# Patient Record
Sex: FEMALE | Race: WHITE | NOT HISPANIC OR LATINO | URBAN - METROPOLITAN AREA
[De-identification: names, ages, dates, MRNs, and addresses within clinical notes are randomized per-mention and may not be internally consistent; named-entity substitution may affect disease eponyms.]

---

## 2023-08-19 ENCOUNTER — OFFICE VISIT (OUTPATIENT)
Dept: URGENT CARE | Facility: CLINIC | Age: 23
End: 2023-08-19
Payer: COMMERCIAL

## 2023-08-19 VITALS
RESPIRATION RATE: 18 BRPM | HEART RATE: 129 BPM | HEIGHT: 65 IN | TEMPERATURE: 103 F | OXYGEN SATURATION: 99 % | SYSTOLIC BLOOD PRESSURE: 124 MMHG | BODY MASS INDEX: 19.99 KG/M2 | DIASTOLIC BLOOD PRESSURE: 75 MMHG | WEIGHT: 120 LBS

## 2023-08-19 DIAGNOSIS — R50.9 FEVER, UNSPECIFIED FEVER CAUSE: Primary | ICD-10-CM

## 2023-08-19 DIAGNOSIS — R09.81 NASAL CONGESTION: ICD-10-CM

## 2023-08-19 LAB
CTP QC/QA: YES
MOLECULAR STREP A: NEGATIVE
POC MOLECULAR INFLUENZA A AGN: NEGATIVE
POC MOLECULAR INFLUENZA B AGN: NEGATIVE
SARS-COV-2 AG RESP QL IA.RAPID: NEGATIVE

## 2023-08-19 PROCEDURE — 99203 PR OFFICE/OUTPT VISIT, NEW, LEVL III, 30-44 MIN: ICD-10-PCS | Mod: S$GLB,,,

## 2023-08-19 PROCEDURE — 87651 STREP A DNA AMP PROBE: CPT | Mod: QW,S$GLB,,

## 2023-08-19 PROCEDURE — 87811 SARS CORONAVIRUS 2 ANTIGEN POCT, MANUAL READ: ICD-10-PCS | Mod: QW,S$GLB,,

## 2023-08-19 PROCEDURE — 87502 INFLUENZA DNA AMP PROBE: CPT | Mod: QW,S$GLB,,

## 2023-08-19 PROCEDURE — 99203 OFFICE O/P NEW LOW 30 MIN: CPT | Mod: S$GLB,,,

## 2023-08-19 PROCEDURE — 87502 POCT INFLUENZA A/B MOLECULAR: ICD-10-PCS | Mod: QW,S$GLB,,

## 2023-08-19 PROCEDURE — 87811 SARS-COV-2 COVID19 W/OPTIC: CPT | Mod: QW,S$GLB,,

## 2023-08-19 PROCEDURE — 87651 POCT STREP A MOLECULAR: ICD-10-PCS | Mod: QW,S$GLB,,

## 2023-08-19 RX ORDER — NORETHINDRONE ACETATE/ETHINYL ESTRADIOL AND FERROUS FUMARATE 1MG-20(21)
1 KIT ORAL EVERY MORNING
COMMUNITY
Start: 2023-06-20

## 2023-08-19 RX ORDER — BUPROPION HYDROCHLORIDE 300 MG/1
1 TABLET ORAL EVERY MORNING
COMMUNITY
Start: 2023-03-27 | End: 2024-03-26

## 2023-08-19 NOTE — LETTER
August 19, 2023      [facility] [address] [place] [address]  Phone: [phone]  Fax: [phone]       Patient: Arlene Lazcano   YOB: 2000  Date of Visit: 08/19/2023    To Whom It May Concern:    Karlos Lazcano  was at [facility] on 08/19/2023. The patient may return to work/school on 8/22/23 with no restrictions. If you have any questions or concerns, or if I can be of further assistance, please do not hesitate to contact me.    Sincerely,    Blanca Zhu PA-C

## 2023-08-19 NOTE — PATIENT INSTRUCTIONS
PLEASE READ YOUR DISCHARGE INSTRUCTIONS ENTIRELY AS IT CONTAINS IMPORTANT INFORMATION.    Please drink plenty of fluids.    Please get plenty of rest.    Please return here or go to the Emergency Department for any concerns or worsening of condition.    Please take an over the counter antihistamine medication (Allegra/Claritin/Zyrtec) of your choice as directed for allergy symptoms and/or runny nose and postnasal drip.    Try an over the counter decongestant for sinus pressure/ear pressure, congestion symptoms like Mucinex D or Sudafed or Phenylephrine. You buy this behind the pharmacy counter.    If you do have Hypertension or palpitations, it is safe to take Coricidin HBP for relief of sinus symptoms.    Tylenol or ibuprofen can also be used as directed for pain and fever unless you have an allergy to them or medical condition such as stomach ulcers, kidney or liver disease or blood thinners etc for which you should not be taking these type of medications.     Sore throat recommendations: Warm fluids, warm salt water gargles, throat lozenges, tea, honey, soup, rest, hydration.    Use over the counter Flonase or Nasocort: one spray each nostril twice daily OR two sprays each nostril once daily until nares dry out, unless you have Glaucoma.   Can also supplement with nasal saline rinse.    Sinus rinses DO NOT USE TAP WATER, if you must, water must be at a rolling boil for 1 minute, let it cool, then use.  May use distilled water, or over the counter nasal saline rinses.  Carmine's vapor rub in shower to help open nasal passages.  May use nasal gel to keep passages moisturized.  May use nasal saline sprays during the day for added relief of congestion.   For those who go to the gym, please do not use the sauna or steam room now to clear sinuses.    Cough     Rest and fluids are important  Can use honey with jarret to soothe your throat    Robitussin or Delsyum for cough suppressant for dry cough.    Mucinex DM or  products containing Guaifenesin or Dextromethorphan for expectorant (wet cough).    Take prescription cough meds (pills) as prescribed; take prescription cough syrup at night as needed for cough.  Do not take both the prescribed cough pills and syrup at the same time or within 6 hours of each other.  Do not take the cough syrup with any other sedative medication as it can can cause drowsiness. Do not operate any heavy machinery, drink or drive while taking the cough syrup.     Please follow up with your primary care doctor or specialist in the next 48-72hrs as needed and if no improvement    If you smoke, please stop smoking.    Please return or see your primary care doctor if you develop new or worsening symptoms.     Lastly, good hand washing and cough hygiene (cough into your elbow) will help prevent the spread of the illness. A general rule is that you are no longer contagious once you have been without a fever for over 24 hours without requiring fever reducing medications.     Please arrange follow up with your primary medical clinic as soon as possible. You must understand that you've received an Urgent Care treatment only and that you may be released before all of your medical problems are known or treated. You, the patient, will arrange for follow up as instructed. If your symptoms worsen or fail to improve you should go to the Emergency Room.

## 2023-08-19 NOTE — PROGRESS NOTES
"Subjective:      Patient ID: Arlene Lazcano is a 23 y.o. female.    Vitals:  height is 5' 5" (1.651 m) and weight is 54.4 kg (120 lb). Her tympanic temperature is 103.1 °F (39.5 °C) (abnormal). Her blood pressure is 124/75 and her pulse is 129 (abnormal). Her respiration is 18 and oxygen saturation is 99%.     Chief Complaint: Fever    Arlene Lazcano is a 23 y.o. female who presents for fever (Tmax 103.1 F) which onset yesterday. Associated sxs include generalized myalgias, congestion, and HA. Patient denies any chills, SOB, CP, abd pain, n/v/d, rash, dizziness, or numbness/tingling. Prior Tx includes ibuprofen last taken at 2 am. No sick contacts.    Fever   This is a new problem. The current episode started yesterday. The problem occurs constantly. The problem has been unchanged. The maximum temperature noted was 103 to 103.9 F. The temperature was taken using a tympanic thermometer. Associated symptoms include congestion, headaches and muscle aches. Pertinent negatives include no abdominal pain, chest pain, coughing, diarrhea, ear pain, nausea, rash, sleepiness, sore throat, urinary pain, vomiting or wheezing. Treatments tried: ibuprofen. The treatment provided mild relief.       Constitution: Positive for fever. Negative for appetite change, chills, sweating and fatigue.   HENT:  Positive for congestion. Negative for ear pain, ear discharge, hearing loss, postnasal drip, sinus pain, sinus pressure, sore throat and trouble swallowing.    Cardiovascular:  Negative for chest pain.   Respiratory:  Negative for cough, sputum production, shortness of breath and wheezing.    Gastrointestinal:  Negative for abdominal pain, nausea, vomiting and diarrhea.   Genitourinary:  Negative for dysuria.   Musculoskeletal:  Positive for muscle ache.   Skin:  Negative for rash.   Neurological:  Positive for headaches. Negative for dizziness, numbness and tingling.      Objective:     Physical Exam   Constitutional: She is oriented to " person, place, and time. She appears well-developed. She is cooperative.  Non-toxic appearance. She does not appear ill. No distress.   HENT:   Head: Normocephalic and atraumatic.   Ears:   Right Ear: Hearing, tympanic membrane, external ear and ear canal normal.   Left Ear: Hearing, tympanic membrane, external ear and ear canal normal.   Nose: Nose normal. No mucosal edema or nasal deformity. No epistaxis. Right sinus exhibits no maxillary sinus tenderness and no frontal sinus tenderness. Left sinus exhibits no maxillary sinus tenderness and no frontal sinus tenderness.   Mouth/Throat: Uvula is midline and mucous membranes are normal. Mucous membranes are moist. No trismus in the jaw. Normal dentition. No uvula swelling. Posterior oropharyngeal erythema present. No oropharyngeal exudate or posterior oropharyngeal edema.   Eyes: Conjunctivae and lids are normal. No scleral icterus. Extraocular movement intact   Neck: Trachea normal and phonation normal. Neck supple. No edema present. No erythema present. No neck rigidity present.   Cardiovascular: Normal rate, regular rhythm, normal heart sounds and normal pulses.   Pulmonary/Chest: Effort normal and breath sounds normal. No stridor. No respiratory distress. She has no decreased breath sounds. She has no wheezes. She has no rhonchi. She has no rales.   Abdominal: Normal appearance.   Musculoskeletal: Normal range of motion.         General: No deformity. Normal range of motion.   Neurological: She is alert and oriented to person, place, and time. She exhibits normal muscle tone. Coordination normal.   Skin: Skin is warm, dry, intact, not diaphoretic and not pale.   Psychiatric: Her speech is normal and behavior is normal. Judgment and thought content normal.   Nursing note and vitals reviewed.      Assessment:     1. Fever, unspecified fever cause    2. Nasal congestion        Results for orders placed or performed in visit on 08/19/23   SARS Coronavirus 2 Antigen,  POCT Manual Read   Result Value Ref Range    SARS Coronavirus 2 Antigen Negative Negative     Acceptable Yes    POCT Influenza A/B MOLECULAR   Result Value Ref Range    POC Molecular Influenza A Ag Negative Negative, Not Reported    POC Molecular Influenza B Ag Negative Negative, Not Reported     Acceptable Yes    POCT Strep A, Molecular   Result Value Ref Range    Molecular Strep A, POC Negative Negative     Acceptable Yes        Plan:       Fever, unspecified fever cause  -     SARS Coronavirus 2 Antigen, POCT Manual Read  -     POCT Influenza A/B MOLECULAR  -     POCT Strep A, Molecular    Nasal congestion      Febrile. VSS. Patient is in NAD. She is anxious.  Discussed negative results with patient.  Advised patient to return in 48 hours for retest of COVID-19 and influenza.  Increase fluid intake and plenty of rest.  Tylenol/Ibuprofen (as permitted) as needed for any pain or discomfort.  If symptoms do not resolve, return to clinic for further evaluation.  ER precautions given such as SOB, CP, or fever not resolved with fever-reducing medications.

## 2023-08-22 ENCOUNTER — OFFICE VISIT (OUTPATIENT)
Dept: URGENT CARE | Facility: CLINIC | Age: 23
End: 2023-08-22
Payer: COMMERCIAL

## 2023-08-22 VITALS
TEMPERATURE: 100 F | WEIGHT: 120 LBS | HEART RATE: 108 BPM | OXYGEN SATURATION: 99 % | BODY MASS INDEX: 19.99 KG/M2 | HEIGHT: 65 IN | DIASTOLIC BLOOD PRESSURE: 70 MMHG | SYSTOLIC BLOOD PRESSURE: 100 MMHG

## 2023-08-22 DIAGNOSIS — J06.9 VIRAL UPPER RESPIRATORY INFECTION: Primary | ICD-10-CM

## 2023-08-22 DIAGNOSIS — J02.9 SORE THROAT: ICD-10-CM

## 2023-08-22 DIAGNOSIS — R50.9 FEVER, UNSPECIFIED FEVER CAUSE: ICD-10-CM

## 2023-08-22 LAB
CTP QC/QA: YES
CTP QC/QA: YES
POC MOLECULAR INFLUENZA A AGN: NEGATIVE
POC MOLECULAR INFLUENZA B AGN: NEGATIVE
SARS-COV-2 AG RESP QL IA.RAPID: NEGATIVE

## 2023-08-22 PROCEDURE — 87811 SARS CORONAVIRUS 2 ANTIGEN POCT, MANUAL READ: ICD-10-PCS | Mod: QW,S$GLB,,

## 2023-08-22 PROCEDURE — 87502 INFLUENZA DNA AMP PROBE: CPT | Mod: QW,S$GLB,,

## 2023-08-22 PROCEDURE — 99213 PR OFFICE/OUTPT VISIT, EST, LEVL III, 20-29 MIN: ICD-10-PCS | Mod: S$GLB,,,

## 2023-08-22 PROCEDURE — 87502 POCT INFLUENZA A/B MOLECULAR: ICD-10-PCS | Mod: QW,S$GLB,,

## 2023-08-22 PROCEDURE — 87811 SARS-COV-2 COVID19 W/OPTIC: CPT | Mod: QW,S$GLB,,

## 2023-08-22 PROCEDURE — 99213 OFFICE O/P EST LOW 20 MIN: CPT | Mod: S$GLB,,,

## 2023-08-22 NOTE — LETTER
August 22, 2023      [facility] [address] [place] [address]  Phone: [phone]  Fax: [phone]       Patient: Arlene Lazcano   YOB: 2000  Date of Visit: 08/22/2023    To Whom It May Concern:    Karlos Lazcano  was at [facility] on 08/22/2023. The patient may return to work/school on 8/25/23 with no restrictions. If you have any questions or concerns, or if I can be of further assistance, please do not hesitate to contact me.    Sincerely,    Blanca Zhu PA-C

## 2023-08-22 NOTE — PROGRESS NOTES
"Subjective:      Patient ID: Arlene Lazcano is a 23 y.o. female.    Vitals:  height is 5' 5" (1.651 m) and weight is 54.4 kg (120 lb). Her temperature is 99.5 °F (37.5 °C). Her blood pressure is 100/70 and her pulse is 108. Her oxygen saturation is 99%.     Chief Complaint: Fever    Arlene Lazcano is a 23 y.o. female who presents for fever (Tmax 103.1 F) which onset 4 days ago. She was seen in [facility] 2 days ago for symptoms. She reports no improvement. Associated sxs include generalized myalgias, congestion, and HA. Patient denies any chills, SOB, CP, abd pain, n/v/d, rash, dizziness, or numbness/tingling. Prior Tx includes ibuprofen. No sick contacts. COVID-19 and strep negative.     Fever   This is a new problem. The current episode started in the past 7 days. The problem occurs constantly. The problem has been gradually worsening. The maximum temperature noted was 99 to 99.9 F. The temperature was taken using an oral thermometer. Associated symptoms include congestion, headaches and muscle aches. Pertinent negatives include no abdominal pain, chest pain, coughing, diarrhea, ear pain, nausea, rash, sleepiness, sore throat, urinary pain, vomiting or wheezing. She has tried acetaminophen for the symptoms. The treatment provided mild relief.       Constitution: Positive for fever. Negative for appetite change, chills, sweating and fatigue.   HENT:  Positive for congestion. Negative for ear pain, ear discharge, hearing loss, postnasal drip, sinus pain, sinus pressure, sore throat and trouble swallowing.    Cardiovascular:  Negative for chest pain.   Respiratory:  Negative for cough, sputum production, shortness of breath and wheezing.    Gastrointestinal:  Negative for abdominal pain, nausea, vomiting and diarrhea.   Genitourinary:  Negative for dysuria.   Musculoskeletal:  Positive for muscle ache.   Skin:  Negative for rash.   Neurological:  Positive for headaches. Negative for dizziness, numbness and tingling.    "   Objective:     Physical Exam   Constitutional: She is oriented to person, place, and time. She appears well-developed. She is cooperative.  Non-toxic appearance. She does not appear ill. No distress.   HENT:   Head: Normocephalic and atraumatic.   Ears:   Right Ear: Hearing, tympanic membrane, external ear and ear canal normal.   Left Ear: Hearing, tympanic membrane, external ear and ear canal normal.   Nose: Nose normal. No mucosal edema or nasal deformity. No epistaxis. Right sinus exhibits no maxillary sinus tenderness and no frontal sinus tenderness. Left sinus exhibits no maxillary sinus tenderness and no frontal sinus tenderness.   Mouth/Throat: Uvula is midline and mucous membranes are normal. Mucous membranes are moist. No trismus in the jaw. Normal dentition. No uvula swelling. Posterior oropharyngeal erythema present. No oropharyngeal exudate or posterior oropharyngeal edema.   Eyes: Conjunctivae and lids are normal. No scleral icterus. Extraocular movement intact   Neck: Trachea normal and phonation normal. Neck supple. No edema present. No erythema present. No neck rigidity present.   Cardiovascular: Normal rate, regular rhythm, normal heart sounds and normal pulses.   Pulmonary/Chest: Effort normal and breath sounds normal. No stridor. No respiratory distress. She has no decreased breath sounds. She has no wheezes. She has no rhonchi. She has no rales.   Abdominal: Normal appearance.   Musculoskeletal: Normal range of motion.         General: No deformity. Normal range of motion.   Neurological: She is alert and oriented to person, place, and time. She exhibits normal muscle tone. Coordination normal.   Skin: Skin is warm, dry, intact, not diaphoretic and not pale.   Psychiatric: Her speech is normal and behavior is normal. Judgment and thought content normal.   Nursing note and vitals reviewed.      Assessment:     1. Viral upper respiratory infection    2. Sore throat    3. Fever, unspecified fever  cause        Results for orders placed or performed in visit on 08/22/23   SARS Coronavirus 2 Antigen, POCT Manual Read   Result Value Ref Range    SARS Coronavirus 2 Antigen Negative Negative     Acceptable Yes    POCT Influenza A/B MOLECULAR   Result Value Ref Range    POC Molecular Influenza A Ag Negative Negative, Not Reported    POC Molecular Influenza B Ag Negative Negative, Not Reported     Acceptable Yes        Plan:       Viral upper respiratory infection    Sore throat  -     SARS Coronavirus 2 Antigen, POCT Manual Read    Fever, unspecified fever cause  -     POCT Influenza A/B MOLECULAR      Febrile. VSS. Patient is in NAD. She is anxious.  Discussed negative results with patient.  Increase fluid intake and plenty of rest.  Tylenol/Ibuprofen (as permitted) as needed for any pain or discomfort.  If symptoms do not resolve, return to clinic for further evaluation.  ER precautions given such as SOB, CP, or fever not resolved with fever-reducing medications.

## 2023-10-10 ENCOUNTER — OFFICE VISIT (OUTPATIENT)
Dept: URGENT CARE | Facility: CLINIC | Age: 23
End: 2023-10-10
Payer: COMMERCIAL

## 2023-10-10 VITALS
WEIGHT: 120 LBS | HEIGHT: 65 IN | DIASTOLIC BLOOD PRESSURE: 78 MMHG | HEART RATE: 111 BPM | TEMPERATURE: 99 F | OXYGEN SATURATION: 96 % | RESPIRATION RATE: 16 BRPM | BODY MASS INDEX: 19.99 KG/M2 | SYSTOLIC BLOOD PRESSURE: 100 MMHG

## 2023-10-10 DIAGNOSIS — J02.9 VIRAL PHARYNGITIS: Primary | ICD-10-CM

## 2023-10-10 LAB
CTP QC/QA: YES
MOLECULAR STREP A: NEGATIVE

## 2023-10-10 PROCEDURE — 99213 PR OFFICE/OUTPT VISIT, EST, LEVL III, 20-29 MIN: ICD-10-PCS | Mod: S$GLB,,, | Performed by: PHYSICIAN ASSISTANT

## 2023-10-10 PROCEDURE — 99213 OFFICE O/P EST LOW 20 MIN: CPT | Mod: S$GLB,,, | Performed by: PHYSICIAN ASSISTANT

## 2023-10-10 PROCEDURE — 87651 POCT STREP A MOLECULAR: ICD-10-PCS | Mod: QW,S$GLB,, | Performed by: PHYSICIAN ASSISTANT

## 2023-10-10 PROCEDURE — 87651 STREP A DNA AMP PROBE: CPT | Mod: QW,S$GLB,, | Performed by: PHYSICIAN ASSISTANT

## 2023-10-10 RX ORDER — IBUPROFEN 600 MG/1
600 TABLET ORAL EVERY 6 HOURS PRN
Qty: 24 TABLET | Refills: 0 | Status: SHIPPED | OUTPATIENT
Start: 2023-10-10 | End: 2023-10-16

## 2023-10-10 NOTE — PATIENT INSTRUCTIONS
Salt water gargles, ibuprofen or naproxen for pain. Rest and drink plenty of fluids. May buy over the counter Chloraseptic Lozenges or spray for severe pain.

## 2023-10-10 NOTE — LETTER
October 10, 2023      [facility] [address] [place] [address]  Phone: [phone]  Fax: [phone]       Patient: Arlene Lazcano   YOB: 2000  Date of Visit: 10/10/2023    To Whom It May Concern:    Karlos Lazcano  was at [facility] on 10/10/2023. The patient may return to work/school on 10/11/23 with no restrictions. If you have any questions or concerns, or if I can be of further assistance, please do not hesitate to contact me.    Sincerely,    Camryn Villareal PA-C  [facility]

## 2023-10-10 NOTE — PROGRESS NOTES
"Subjective:      Patient ID: Arlene Lazcano is a 23 y.o. female.    Vitals:  height is 5' 5" (1.651 m) and weight is 54.4 kg (120 lb). Her temperature is 99.4 °F (37.4 °C). Her blood pressure is 100/78 and her pulse is 111 (abnormal). Her respiration is 16 and oxygen saturation is 96%.     Chief Complaint: Sore Throat    Sore throat, swollen glands, mild PND and right earache x 2 days. Low grade fever. No cough.     Sore Throat   This is a new problem. The current episode started in the past 7 days (Onset two days ago). The problem has been gradually worsening. The pain is worse on the right side. There has been no fever. The pain is at a severity of 6/10. The pain is moderate. Associated symptoms include ear pain (right ear pain), headaches, a hoarse voice, a plugged ear sensation, neck pain and swollen glands (right side). Pertinent negatives include no abdominal pain, congestion (nasal congestion), coughing, diarrhea, ear discharge, shortness of breath, trouble swallowing (painful to swallow) or vomiting. She has had no exposure to strep or mono. She has tried NSAIDs for the symptoms. The treatment provided mild relief.       Constitution: Positive for chills, fatigue and fever.   HENT:  Positive for ear pain (right ear pain), postnasal drip and sore throat. Negative for ear discharge, congestion (nasal congestion) and trouble swallowing (painful to swallow).    Neck: Positive for neck pain.   Eyes:  Negative for eye itching and eye pain.   Respiratory:  Negative for cough, sputum production and shortness of breath.    Gastrointestinal:  Negative for abdominal pain, nausea, vomiting and diarrhea.   Genitourinary:  Negative for dysuria.   Musculoskeletal:  Positive for muscle ache.   Neurological:  Positive for headaches.      Objective:     Physical Exam   Constitutional: She is oriented to person, place, and time. She appears well-developed. She is cooperative.  Non-toxic appearance. She does not appear ill. No " distress.   HENT:   Head: Normocephalic and atraumatic.   Ears:   Right Ear: Hearing, external ear and ear canal normal. Tympanic membrane is bulging. No middle ear effusion.   Left Ear: Hearing, tympanic membrane, external ear and ear canal normal.  No middle ear effusion.   Nose: Nose normal. No mucosal edema, rhinorrhea or nasal deformity. No epistaxis. Right sinus exhibits no maxillary sinus tenderness and no frontal sinus tenderness. Left sinus exhibits no maxillary sinus tenderness and no frontal sinus tenderness.   Mouth/Throat: Uvula is midline and mucous membranes are normal. No trismus in the jaw. Normal dentition. No uvula swelling. Oropharyngeal exudate and posterior oropharyngeal erythema present. No posterior oropharyngeal edema. Tonsils are 2+ on the right. Tonsils are 2+ on the left. Tonsillar exudate.   Eyes: Conjunctivae and lids are normal. No scleral icterus.   Neck: Trachea normal and phonation normal. Neck supple. No edema present. No erythema present. No neck rigidity present.   Cardiovascular: Regular rhythm, normal heart sounds and normal pulses. Tachycardia present.   Pulmonary/Chest: Effort normal and breath sounds normal. No respiratory distress. She has no decreased breath sounds. She has no wheezes. She has no rhonchi. She has no rales.   Abdominal: Normal appearance.   Musculoskeletal: Normal range of motion.         General: No deformity. Normal range of motion.   Lymphadenopathy:     She has cervical adenopathy (anterior chain).   Neurological: She is alert and oriented to person, place, and time. She exhibits normal muscle tone. Coordination normal.   Skin: Skin is warm, dry, intact, not diaphoretic and not pale.   Psychiatric: Her speech is normal and behavior is normal. Judgment and thought content normal.   Nursing note and vitals reviewed.    Results for orders placed or performed in visit on 10/10/23   POCT Strep A, Molecular   Result Value Ref Range    Molecular Strep A, POC  Negative Negative     Acceptable Yes        Assessment:     1. Viral pharyngitis        Plan:       Viral pharyngitis  -     POCT Strep A, Molecular  -     ibuprofen (ADVIL,MOTRIN) 600 MG tablet; Take 1 tablet (600 mg total) by mouth every 6 (six) hours as needed for Pain.  Dispense: 24 tablet; Refill: 0        Camryn Villareal PA-C  [facility]            Patient Instructions   Salt water gargles, ibuprofen or naproxen for pain. Rest and drink plenty of fluids. May buy over the counter Chloraseptic Lozenges or spray for severe pain.

## 2024-03-07 ENCOUNTER — OFFICE VISIT (OUTPATIENT)
Dept: URGENT CARE | Facility: CLINIC | Age: 24
End: 2024-03-07
Payer: COMMERCIAL

## 2024-03-07 VITALS
TEMPERATURE: 99 F | SYSTOLIC BLOOD PRESSURE: 124 MMHG | HEIGHT: 65 IN | WEIGHT: 124 LBS | HEART RATE: 127 BPM | OXYGEN SATURATION: 98 % | BODY MASS INDEX: 20.66 KG/M2 | RESPIRATION RATE: 20 BRPM | DIASTOLIC BLOOD PRESSURE: 82 MMHG

## 2024-03-07 DIAGNOSIS — J06.9 URI WITH COUGH AND CONGESTION: Primary | ICD-10-CM

## 2024-03-07 DIAGNOSIS — R00.0 TACHYCARDIA: ICD-10-CM

## 2024-03-07 PROCEDURE — 99213 OFFICE O/P EST LOW 20 MIN: CPT | Mod: S$GLB,,, | Performed by: PHYSICIAN ASSISTANT

## 2024-03-07 PROCEDURE — 87811 SARS-COV-2 COVID19 W/OPTIC: CPT | Mod: QW,S$GLB,, | Performed by: PHYSICIAN ASSISTANT

## 2024-03-07 PROCEDURE — 87502 INFLUENZA DNA AMP PROBE: CPT | Mod: QW,S$GLB,, | Performed by: PHYSICIAN ASSISTANT

## 2024-03-07 RX ORDER — BENZONATATE 200 MG/1
200 CAPSULE ORAL 3 TIMES DAILY PRN
Qty: 21 CAPSULE | Refills: 0 | Status: SHIPPED | OUTPATIENT
Start: 2024-03-07 | End: 2024-03-14

## 2024-03-07 NOTE — LETTER
March 7, 2024      [facility] [address] [place] [address]  Phone: [phone]  Fax: [phone]       Patient: Arlene Lazcano   YOB: 2000  Date of Visit: 03/07/2024    To Whom It May Concern:    Karlos Lazcano  was at [facility] on 03/07/2024. The patient may return to work/school on 03/8/24 with no restrictions. If you have any questions or concerns, or if I can be of further assistance, please do not hesitate to contact me.    Sincerely,    Camryn Villareal PA-C  [facility]

## 2024-03-07 NOTE — PROGRESS NOTES
"Subjective:      Patient ID: Arlene Lazcano is a 23 y.o. female.    Vitals:  height is 5' 5" (1.651 m) and weight is 56.3 kg (124 lb 0.1 oz). Her temperature is 99 °F (37.2 °C). Her blood pressure is 124/82 and her pulse is 127 (abnormal). Her respiration is 20 and oxygen saturation is 98%.     Chief Complaint: Headache    CCC x 4 days, low grade fever 99F x 2 days. No CP, SOB, or palpitations. No LE pain/swelling, recent travel or hx of PE/DVT    HR noted to be 127 bpm. Pt does note that she took cold and flu meds with decongestant in it     Other  This is a new problem. The current episode started in the past 7 days (Onset Sunday). The problem occurs constantly. The problem has been gradually worsening. Associated symptoms include chills (every now and then), congestion (nasal congestion), coughing, fatigue, a fever (99.7 last night), headaches, myalgias and nausea. Pertinent negatives include no abdominal pain, change in bowel habit, chest pain, sore throat, swollen glands or vomiting. Associated symptoms comments: Runny nose  . Nothing aggravates the symptoms. She has tried NSAIDs (Ibuprofen, OTC Cold & Flu) for the symptoms. The treatment provided mild relief.       Constitution: Positive for chills (every now and then), fatigue and fever (99.7 last night).   HENT:  Positive for congestion (nasal congestion). Negative for sore throat.    Cardiovascular:  Negative for chest pain.   Respiratory:  Positive for cough and sputum production. Negative for shortness of breath.    Gastrointestinal:  Positive for nausea. Negative for abdominal pain and vomiting.   Musculoskeletal:  Positive for muscle ache.   Neurological:  Positive for headaches.      Objective:     Physical Exam   Constitutional: She is oriented to person, place, and time. She appears well-developed. She is cooperative.  Non-toxic appearance. She does not appear ill. No distress.   HENT:   Head: Normocephalic and atraumatic.   Ears:   Right Ear: Hearing, " tympanic membrane, external ear and ear canal normal.   Left Ear: Hearing, tympanic membrane, external ear and ear canal normal.   Nose: Congestion present. No mucosal edema, rhinorrhea or nasal deformity. No epistaxis. Right sinus exhibits no maxillary sinus tenderness and no frontal sinus tenderness. Left sinus exhibits no maxillary sinus tenderness and no frontal sinus tenderness.   Mouth/Throat: Uvula is midline, oropharynx is clear and moist and mucous membranes are normal. No trismus in the jaw. Normal dentition. No uvula swelling. No oropharyngeal exudate, posterior oropharyngeal edema or posterior oropharyngeal erythema.   Eyes: Conjunctivae and lids are normal. No scleral icterus.   Neck: Trachea normal and phonation normal. Neck supple. No edema present. No erythema present. No neck rigidity present.   Cardiovascular: Regular rhythm, normal heart sounds and normal pulses. Tachycardia present.   Pulses:       Radial pulses are 2+ on the right side and 2+ on the left side.   Pulmonary/Chest: Effort normal and breath sounds normal. No respiratory distress. She has no decreased breath sounds. She has no wheezes. She has no rhonchi. She has no rales.   Abdominal: Normal appearance.   Musculoskeletal: Normal range of motion.         General: No deformity. Normal range of motion.      Right lower leg: No edema.      Left lower leg: No edema.   Neurological: She is alert and oriented to person, place, and time. She exhibits normal muscle tone. Coordination normal.   Skin: Skin is warm, dry, intact, not diaphoretic and not pale.   Psychiatric: Her speech is normal and behavior is normal. Judgment and thought content normal.   Nursing note and vitals reviewed.    Results for orders placed or performed in visit on 03/07/24   POCT Influenza A/B MOLECULAR   Result Value Ref Range    POC Molecular Influenza A Ag Negative Negative, Not Reported    POC Molecular Influenza B Ag Negative Negative, Not Reported    Quality  Control Acceptable Yes    SARS Coronavirus 2 Antigen, POCT Manual Read   Result Value Ref Range    SARS Coronavirus 2 Antigen Negative Negative     Acceptable Yes        Assessment:     1. URI with cough and congestion    2. Tachycardia        Plan:     HR elevation likely in response to fever and from decongestant use - advised to d/c. Wells score zero. To monitor for any alarm symptoms     URI with cough and congestion  -     POCT Influenza A/B MOLECULAR  -     SARS Coronavirus 2 Antigen, POCT Manual Read  -     benzonatate (TESSALON) 200 MG capsule; Take 1 capsule (200 mg total) by mouth 3 (three) times daily as needed for Cough.  Dispense: 21 capsule; Refill: 0    Tachycardia  -     POCT Influenza A/B MOLECULAR    Camryn Villareal PA-C  [facility]       Patient Instructions   Tessalon for cough up to 3x daily as needed. Sterile saline rinses and flonase to decongest. Limit any products with decongestants (pseudoephedrine and phenylephrine) as your heart rate is elevated today. Tylenol or motrin for pain/fever. Rest and drink plenty of fluids.     Follow up with your doctor for any persistent new fever > 100 > 4 days, or persistent sinus pressure/congestion > 10 days.You need to be seen urgently if you develop any chest pain or shortness of breath.

## 2024-03-07 NOTE — PATIENT INSTRUCTIONS
Tessalon for cough up to 3x daily as needed. Sterile saline rinses and flonase to decongest. Limit any products with decongestants (pseudoephedrine and phenylephrine) as your heart rate is elevated today. Tylenol or motrin for pain/fever. Rest and drink plenty of fluids.     Follow up with your doctor for any persistent new fever > 100 > 4 days, or persistent sinus pressure/congestion > 10 days.You need to be seen urgently if you develop any chest pain or shortness of breath.